# Patient Record
Sex: MALE | Race: WHITE | NOT HISPANIC OR LATINO
[De-identification: names, ages, dates, MRNs, and addresses within clinical notes are randomized per-mention and may not be internally consistent; named-entity substitution may affect disease eponyms.]

---

## 2022-08-23 ENCOUNTER — NON-APPOINTMENT (OUTPATIENT)
Age: 62
End: 2022-08-23

## 2022-08-25 ENCOUNTER — TRANSCRIPTION ENCOUNTER (OUTPATIENT)
Age: 62
End: 2022-08-25

## 2022-08-25 ENCOUNTER — APPOINTMENT (OUTPATIENT)
Dept: NEPHROLOGY | Facility: CLINIC | Age: 62
End: 2022-08-25

## 2022-08-25 VITALS
WEIGHT: 210 LBS | RESPIRATION RATE: 14 BRPM | DIASTOLIC BLOOD PRESSURE: 60 MMHG | HEIGHT: 70 IN | BODY MASS INDEX: 30.06 KG/M2 | SYSTOLIC BLOOD PRESSURE: 152 MMHG | HEART RATE: 66 BPM

## 2022-08-25 DIAGNOSIS — Z83.3 FAMILY HISTORY OF DIABETES MELLITUS: ICD-10-CM

## 2022-08-25 DIAGNOSIS — Z72.89 OTHER PROBLEMS RELATED TO LIFESTYLE: ICD-10-CM

## 2022-08-25 DIAGNOSIS — E79.0 HYPERURICEMIA W/OUT SIGNS OF INFLAMMATORY ARTHRITIS AND TOPHACEOUS DISEASE: ICD-10-CM

## 2022-08-25 DIAGNOSIS — Z84.1 FAMILY HISTORY OF DISORDERS OF KIDNEY AND URETER: ICD-10-CM

## 2022-08-25 DIAGNOSIS — Z78.9 OTHER SPECIFIED HEALTH STATUS: ICD-10-CM

## 2022-08-25 DIAGNOSIS — Z86.69 PERSONAL HISTORY OF OTHER DISEASES OF THE NERVOUS SYSTEM AND SENSE ORGANS: ICD-10-CM

## 2022-08-25 DIAGNOSIS — Z85.47 PERSONAL HISTORY OF MALIGNANT NEOPLASM OF TESTIS: ICD-10-CM

## 2022-08-25 DIAGNOSIS — G43.909 MIGRAINE, UNSPECIFIED, NOT INTRACTABLE, W/OUT STATUS MIGRAINOSUS: ICD-10-CM

## 2022-08-25 DIAGNOSIS — Z83.79 FAMILY HISTORY OF OTHER DISEASES OF THE DIGESTIVE SYSTEM: ICD-10-CM

## 2022-08-25 DIAGNOSIS — I35.0 NONRHEUMATIC AORTIC (VALVE) STENOSIS: ICD-10-CM

## 2022-08-25 DIAGNOSIS — Z00.00 ENCOUNTER FOR GENERAL ADULT MEDICAL EXAMINATION W/OUT ABNORMAL FINDINGS: ICD-10-CM

## 2022-08-25 PROCEDURE — 99204 OFFICE O/P NEW MOD 45 MIN: CPT

## 2022-08-25 RX ORDER — ELETRIPTAN HYDROBROMIDE 40 MG/1
40 TABLET, FILM COATED ORAL
Refills: 0 | Status: ACTIVE | COMMUNITY

## 2022-08-25 RX ORDER — ZOLPIDEM TARTRATE 10 MG/1
10 TABLET ORAL
Refills: 0 | Status: ACTIVE | COMMUNITY

## 2022-08-25 RX ORDER — DIAZEPAM 10 MG/1
10 TABLET ORAL
Refills: 0 | Status: ACTIVE | COMMUNITY

## 2022-08-25 RX ORDER — ALLOPURINOL 300 MG/1
300 TABLET ORAL DAILY
Qty: 90 | Refills: 1 | Status: ACTIVE | COMMUNITY

## 2022-08-25 NOTE — CONSULT LETTER
[Dear  ___] : Dear  [unfilled], [Consult Letter:] : I had the pleasure of evaluating your patient, [unfilled]. [Please see my note below.] : Please see my note below. [Consult Closing:] : Thank you very much for allowing me to participate in the care of this patient.  If you have any questions, please do not hesitate to contact me. [Sincerely,] : Sincerely, [DrRandy  ___] : Dr. BHATIA [FreeTextEntry3] : Darren

## 2022-08-25 NOTE — PHYSICAL EXAM
[General Appearance - Alert] : alert [General Appearance - In No Acute Distress] : in no acute distress [General Appearance - Well Nourished] : well nourished [General Appearance - Well Developed] : well developed [Sclera] : the sclera and conjunctiva were normal [Extraocular Movements] : extraocular movements were intact [Neck Appearance] : the appearance of the neck was normal [] : no respiratory distress [Respiration, Rhythm And Depth] : normal respiratory rhythm and effort [Exaggerated Use Of Accessory Muscles For Inspiration] : no accessory muscle use [Auscultation Breath Sounds / Voice Sounds] : lungs were clear to auscultation bilaterally [Heart Rate And Rhythm] : heart rate was normal and rhythm regular [Heart Sounds] : normal S1 and S2 [Heart Sounds Gallop] : no gallops [Heart Sounds Pericardial Friction Rub] : no pericardial rub [Full Pulse] : the pedal pulses are present [Edema] : there was no peripheral edema [Bowel Sounds] : normal bowel sounds [Abdomen Soft] : soft [Abdomen Tenderness] : non-tender [Abnormal Walk] : normal gait [Nail Clubbing] : no clubbing  or cyanosis of the fingernails [Involuntary Movements] : no involuntary movements were seen [Skin Color & Pigmentation] : normal skin color and pigmentation [Skin Turgor] : normal skin turgor [No Focal Deficits] : no focal deficits [Oriented To Time, Place, And Person] : oriented to person, place, and time [Impaired Insight] : insight and judgment were intact [Affect] : the affect was normal [Mood] : the mood was normal [FreeTextEntry1] : bilateral radial pulses 2+ with equal timing

## 2022-08-25 NOTE — HISTORY OF PRESENT ILLNESS
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 Martin Elizabeth who I last saw in 2016 for a mildly elevated serum creatinine which I suspected was due to the use of Benicar and two diuretics.  He is here today regarding an elevated serum creatinine.  Dr. Krueger has been well for the past 6 years.  Dr. Krueger was started on fenofibrate around February 2022 for elevated triglycerides.  The dose was increased around May 2022.  He lost around 25 pounds.  His triglyceride level improved.  In addition, his allopurinol dose was increased around May 2022.

## 2022-08-25 NOTE — REVIEW OF SYSTEMS
[Loss Of Hearing] : hearing loss [Negative] : Heme/Lymph [FreeTextEntry2] : Intentional weight loss of 25 pounds (with diet and exercise), treadmill, elliptical, rowing machine [FreeTextEntry8] : nocturia x 1 [de-identified] : balancing issues (Meniere's), bilateral tingling "in the front part of the feet"- developed a few years ago

## 2022-08-25 NOTE — ASSESSMENT
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  II last saw in 2016 for a mildly elevated serum creatinine which I suspected was due to the use of Benicar and two diuretics.  He is here today regarding an elevated serum creatinine.  Dr. Krueger has been well for the past 6 years.  Dr. Krueger was started on fenofibrate around February 2022 for elevated triglycerides.  The dose was increased around May 2022.  He lost around 25 pounds.  His triglyceride level improved.  In addition, his allopurinol dose was increased around May 2022.\par \par Dr. Krueger's BUN/creatinine levels have trended as follows:  26/1.29 (03/12/2015), 29/1.49 (11/20/2015) ---> 28/1.50 (05/12/2017), 23/1.29 (05/14/2018), 35/1.65 (09/20/2019), 24/1.54 (06/26/2020), 28/1.44 (12/10/2021), 20/1.67 (05/02/2022), 26/1.92 (07/01/2022).\par \par The urinalysis of 05/02/2022 has a specific gravity of 1.030 or greater with no blood or protein, and moderate amorphous urates. \par \par IMPRESSION:\par \par (1) Elevated serum creatinine for years.  The variations in the serum creatinine between 2015 and 2021 are suggestive of variations in hydration (+/- medication changes if there were any).  \par \par (2) Acute kidney injury. Increasing serum creatinine.  There is a definite trend from December 2021 until this summer.  I am unsure a lack of hydration while taking an ARB and two diuretics in the presence of the very warm weather in a gentleman that exercises on a regular bases explains the change in kidney function, though it may.  Note that Fenofibrate has been associated with TAISHA.\par \par (3) Hypertension.  Elevated blood pressures today in the office.  Dr. Krueger reports he has white coat hypertension.\par \par (4) Gout.  No recent exacerbations\par \par RECOMMENDATIONS:\par \par (1) Increase water intake to eight 8-ounce glasses daily.\par (2) I ordered repeat lab studies to be done in 1-2 weeks\par (3) Renal ultrasound.\par (4) I made no changes to the medication regimen.\par (5) Check blood pressures at home and text them to me. \par \par I will call Dr. Krueger with the results of the tests and make further recommendations \par \par

## 2022-09-02 LAB
ALBUMIN SERPL ELPH-MCNC: 4.6 G/DL
ALP BLD-CCNC: 37 U/L
ALT SERPL-CCNC: 35 U/L
ANION GAP SERPL CALC-SCNC: 14 MMOL/L
APPEARANCE: CLEAR
AST SERPL-CCNC: 22 U/L
BACTERIA: NEGATIVE
BASOPHILS # BLD AUTO: 0.05 K/UL
BASOPHILS NFR BLD AUTO: 0.8 %
BILIRUB SERPL-MCNC: 0.6 MG/DL
BILIRUBIN URINE: NEGATIVE
BLOOD URINE: NEGATIVE
BUN SERPL-MCNC: 26 MG/DL
CALCIUM SERPL-MCNC: 10.1 MG/DL
CHLORIDE SERPL-SCNC: 104 MMOL/L
CK SERPL-CCNC: 42 U/L
CO2 SERPL-SCNC: 25 MMOL/L
COLOR: NORMAL
CREAT SERPL-MCNC: 1.69 MG/DL
EGFR: 46 ML/MIN/1.73M2
EOSINOPHIL # BLD AUTO: 0.12 K/UL
EOSINOPHIL NFR BLD AUTO: 1.8 %
GLUCOSE QUALITATIVE U: NEGATIVE
GLUCOSE SERPL-MCNC: 100 MG/DL
HCT VFR BLD CALC: 46.3 %
HGB BLD-MCNC: 15.8 G/DL
HYALINE CASTS: 0 /LPF
IMM GRANULOCYTES NFR BLD AUTO: 0.6 %
KETONES URINE: NEGATIVE
LEUKOCYTE ESTERASE URINE: NEGATIVE
LYMPHOCYTES # BLD AUTO: 1.4 K/UL
LYMPHOCYTES NFR BLD AUTO: 21.2 %
MAN DIFF?: NORMAL
MCHC RBC-ENTMCNC: 30.7 PG
MCHC RBC-ENTMCNC: 34.1 GM/DL
MCV RBC AUTO: 89.9 FL
MICROSCOPIC-UA: NORMAL
MONOCYTES # BLD AUTO: 0.74 K/UL
MONOCYTES NFR BLD AUTO: 11.2 %
NEUTROPHILS # BLD AUTO: 4.25 K/UL
NEUTROPHILS NFR BLD AUTO: 64.4 %
NITRITE URINE: NEGATIVE
PH URINE: 5
PLATELET # BLD AUTO: 212 K/UL
POTASSIUM SERPL-SCNC: 4.2 MMOL/L
PROT SERPL-MCNC: 6.3 G/DL
PROTEIN URINE: NEGATIVE
RBC # BLD: 5.15 M/UL
RBC # FLD: 13.5 %
RED BLOOD CELLS URINE: 1 /HPF
SODIUM ?TM SUB UR QN: 47 MMOL/L
SODIUM SERPL-SCNC: 144 MMOL/L
SPECIFIC GRAVITY URINE: 1.02
SQUAMOUS EPITHELIAL CELLS: 1 /HPF
URATE SERPL-MCNC: 4.5 MG/DL
UROBILINOGEN URINE: NORMAL
WBC # FLD AUTO: 6.6 K/UL
WHITE BLOOD CELLS URINE: 1 /HPF

## 2022-09-20 ENCOUNTER — APPOINTMENT (OUTPATIENT)
Dept: NEPHROLOGY | Facility: CLINIC | Age: 62
End: 2022-09-20

## 2022-09-20 VITALS
SYSTOLIC BLOOD PRESSURE: 134 MMHG | DIASTOLIC BLOOD PRESSURE: 60 MMHG | HEART RATE: 66 BPM | RESPIRATION RATE: 12 BRPM | HEIGHT: 70 IN

## 2022-09-20 DIAGNOSIS — N17.9 ACUTE KIDNEY FAILURE, UNSPECIFIED: ICD-10-CM

## 2022-09-20 DIAGNOSIS — R79.89 OTHER SPECIFIED ABNORMAL FINDINGS OF BLOOD CHEMISTRY: ICD-10-CM

## 2022-09-20 PROCEDURE — 99214 OFFICE O/P EST MOD 30 MIN: CPT

## 2022-09-20 NOTE — REVIEW OF SYSTEMS
[Loss Of Hearing] : hearing loss [Negative] : Heme/Lymph [FreeTextEntry2] : Intentional weight loss of 25 pounds (with diet and exercise), treadmill, elliptical, rowing machine- stable since the last visit [FreeTextEntry8] : nocturia x 1 [de-identified] : balancing issues (Meniere's), bilateral tingling "in the front part of the feet"- developed a few years ago

## 2022-09-20 NOTE — ASSESSMENT
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  II last saw in 2016 for a mildly elevated serum creatinine which I suspected was due to the use of Benicar and two diuretics.  He is here today regarding an elevated serum creatinine.  Dr. Krueger has been well for the past 6 years.   Pati was started on fenofibrate around February 2022 for elevated triglycerides.  The dose was increased around May 2022.  He lost around 25 pounds.  His triglyceride level improved.  In addition, his allopurinol dose was increased around May 2022.\par \par  Pati's BUN/creatinine levels have trended as follows:  26/1.29 (03/12/2015), 29/1.49 (11/20/2015) ---> 28/1.50 (05/12/2017), 23/1.29 (05/14/2018), 35/1.65 (09/20/2019), 24/1.54 (06/26/2020), 28/1.44 (12/10/2021), 20/1.67 (05/02/2022), 26/1.92 (07/01/2022) --> 26/1.69 (08/25/2022).\par \par The urinalysis of 05/02/2022 has a specific gravity of 1.030 or greater with no blood or protein, and moderate amorphous urates.   The urinalysis of 08/31/2022 was normal except for a single WBC and a single RBC in addition to an epithelial cell.\par \par The renal ultrasound of 08/27/2022 demonstrated a 10.8 cm right kidney and 11 cm left kidney both with normal cortical thickness and echogenicity; a simple cyst in the left kidney measuring 1.3 cm  AND a partially imaged liver with increased echogenicity typical for steatosis. \par \par IMPRESSION:\par \par (1) Elevated serum creatinine for years.  The variations in the serum creatinine between 2015 and 2021 are suggestive of variations in hydration (+/- medication changes if there were any).  The rest of the workup was normal.  I suspect Dr. Krueger's renal function is either normal or close to it. \par \par (2) Acute kidney injury. Increasing serum creatinine.  I suspect this was all due to dehydration. \par \par (3) Hypertension.  Better blood pressure today.  \par \par (4) Gout.  No recent exacerbations\par \par (5) Abnormal ultrasound of liver. \par \par RECOMMENDATIONS:\par \par (1) Continue staying well hydrated\par (2) Continue exercise and weight loss.  We talked about the possibility of removing some of the antihypertensive medications in the future.\par (3) I made no changes to the medication regimen.\par (4) Please follow up on the abnormal ultrasound of part of the liver\par \par Dr. Krueger will return in 1 year for follow up. \par \par

## 2022-09-20 NOTE — PHYSICAL EXAM
[General Appearance - Alert] : alert [General Appearance - In No Acute Distress] : in no acute distress [General Appearance - Well Nourished] : well nourished [General Appearance - Well Developed] : well developed [Sclera] : the sclera and conjunctiva were normal [Extraocular Movements] : extraocular movements were intact [Neck Appearance] : the appearance of the neck was normal [] : no respiratory distress [Respiration, Rhythm And Depth] : normal respiratory rhythm and effort [Exaggerated Use Of Accessory Muscles For Inspiration] : no accessory muscle use [Auscultation Breath Sounds / Voice Sounds] : lungs were clear to auscultation bilaterally [Heart Rate And Rhythm] : heart rate was normal and rhythm regular [Heart Sounds] : normal S1 and S2 [Heart Sounds Gallop] : no gallops [Heart Sounds Pericardial Friction Rub] : no pericardial rub [Edema] : there was no peripheral edema [Bowel Sounds] : normal bowel sounds [Abdomen Soft] : soft [Abdomen Tenderness] : non-tender [Abnormal Walk] : normal gait [Involuntary Movements] : no involuntary movements were seen [Skin Color & Pigmentation] : normal skin color and pigmentation [Skin Turgor] : normal skin turgor [No Focal Deficits] : no focal deficits [Oriented To Time, Place, And Person] : oriented to person, place, and time [Impaired Insight] : insight and judgment were intact [Affect] : the affect was normal [Mood] : the mood was normal [FreeTextEntry1] : LIBRA/DM best heard in the LUSB

## 2022-09-20 NOTE — HISTORY OF PRESENT ILLNESS
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 Martin Elizabeth who I last saw in 2016 for a mildly elevated serum creatinine which I suspected was due to the use of Benicar and two diuretics.  He is here for follow up  regarding an elevated serum creatinine.  \par \par Dr. Krueger has been well for the past 6 years.  Dr. Krueger was started on fenofibrate around February 2022 for elevated triglycerides.  The dose was increased around May 2022.  He lost around 25 pounds.  His triglyceride level improved.  In addition, his allopurinol dose was increased around May 2022.

## 2023-01-24 ENCOUNTER — APPOINTMENT (OUTPATIENT)
Dept: NEPHROLOGY | Facility: CLINIC | Age: 63
End: 2023-01-24

## 2023-06-27 ENCOUNTER — NON-APPOINTMENT (OUTPATIENT)
Age: 63
End: 2023-06-27

## 2023-08-08 ENCOUNTER — APPOINTMENT (OUTPATIENT)
Dept: NEPHROLOGY | Facility: CLINIC | Age: 63
End: 2023-08-08

## 2023-09-19 ENCOUNTER — APPOINTMENT (OUTPATIENT)
Dept: NEPHROLOGY | Facility: CLINIC | Age: 63
End: 2023-09-19

## 2024-04-11 ENCOUNTER — APPOINTMENT (OUTPATIENT)
Dept: NEPHROLOGY | Facility: CLINIC | Age: 64
End: 2024-04-11
Payer: COMMERCIAL

## 2024-04-11 PROCEDURE — G2211 COMPLEX E/M VISIT ADD ON: CPT

## 2024-04-11 PROCEDURE — 99214 OFFICE O/P EST MOD 30 MIN: CPT

## 2024-04-11 RX ORDER — FENOFIBRATE 145 MG/1
145 TABLET, COATED ORAL DAILY
Refills: 0 | Status: ACTIVE | COMMUNITY

## 2024-04-11 RX ORDER — FENOFIBRATE 150 MG/1
150 CAPSULE ORAL DAILY
Refills: 0 | Status: DISCONTINUED | COMMUNITY
End: 2024-04-11

## 2024-04-11 RX ORDER — TELMISARTAN 80 MG/1
80 TABLET ORAL DAILY
Refills: 0 | Status: ACTIVE | COMMUNITY

## 2024-04-11 RX ORDER — AMLODIPINE BESYLATE 10 MG/1
10 TABLET ORAL DAILY
Refills: 0 | Status: ACTIVE | COMMUNITY

## 2024-04-12 LAB
ALBUMIN SERPL ELPH-MCNC: 4.9 G/DL
ANION GAP SERPL CALC-SCNC: 16 MMOL/L
BUN SERPL-MCNC: 21 MG/DL
CALCIUM SERPL-MCNC: 10 MG/DL
CHLORIDE SERPL-SCNC: 103 MMOL/L
CO2 SERPL-SCNC: 23 MMOL/L
CREAT SERPL-MCNC: 1.7 MG/DL
CREAT SPEC-SCNC: 43 MG/DL
CREAT/PROT UR: 0.2 RATIO
EGFR: 45 ML/MIN/1.73M2
GLUCOSE SERPL-MCNC: 98 MG/DL
PHOSPHATE SERPL-MCNC: 2.7 MG/DL
POTASSIUM SERPL-SCNC: 4 MMOL/L
PROT UR-MCNC: 7 MG/DL
SODIUM SERPL-SCNC: 142 MMOL/L

## 2024-04-12 RX ORDER — HYDROCHLOROTHIAZIDE 12.5 MG/1
12.5 TABLET ORAL DAILY
Qty: 60 | Refills: 2 | Status: ACTIVE | COMMUNITY
Start: 2024-04-12 | End: 1900-01-01

## 2024-04-12 NOTE — PHYSICAL EXAM
[General Appearance - Alert] : alert [General Appearance - In No Acute Distress] : in no acute distress [Sclera] : the sclera and conjunctiva were normal [Outer Ear] : the ears and nose were normal in appearance [Jugular Venous Distention Increased] : there was no jugular-venous distention [Respiration, Rhythm And Depth] : normal respiratory rhythm and effort [Heart Sounds] : normal S1 and S2 [Heart Sounds Gallop] : no gallops [Edema] : there was no peripheral edema [Bowel Sounds] : normal bowel sounds [Abdomen Soft] : soft [No CVA Tenderness] : no ~M costovertebral angle tenderness [Nail Clubbing] : no clubbing  or cyanosis of the fingernails [] : no rash [No Focal Deficits] : no focal deficits [Oriented To Time, Place, And Person] : oriented to person, place, and time [Affect] : the affect was normal [Mood] : the mood was normal

## 2024-04-12 NOTE — ASSESSMENT
[FreeTextEntry1] : 1.CKD stage 3a: Pt with elevated serum creatinine for years. The variation in serum creatinine over the years was secondary to volume status of the patient. Recent Scr ~1.9 in January 2024. Pt likely has CKD secondary to long standing HTN. Importance of good BP control reviewed with patient. Continue with ARB (Telmisartan) for HTN and and anti-proteinuric effect. Check renal panel, UA and UPCR today. Advised patient to avoid NSAIDs, RCAs, and other nephrotoxins. Monitor renal function.  2.HTN: Repeat BP reading in higher range during office visit. Continue to monitor BP on current BP meds. Will start another HTN medication pending renal panel. If the BP remains elevated, will consider work up for secondary HTN. Advised to maintain BP recording log at home. Low salt diet advised.  Follow up pending lab results.

## 2024-04-12 NOTE — HISTORY OF PRESENT ILLNESS
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 Martin Elizabeth  who is coming for a follow up visit after ~19 months.  Pt was initially seen by Dr. Darren Luz for elevated serum creatinine, with last visit being in November 2022.    On review, It was noted that Pt has elevated serum creatinine for years. The variation in serum creatinine over the years was secondary to volume status of the patient.    Scr trend: 1.65 (09/20/19), 1.54 (06/26/20), 1.44 (12/10/21), 1.92 (07/01/2022), 1.9 (6/16/23).  UA done on 6/16/23 was WNL with No RBCs and proteins (but has high specific gravity).  Kidney US done on (08/27/2022 ) showed no renal masses, hydronephrosis, or calculi.  Pt says his BP has been well controlled until recently when it was ranging on higher side. Pt says his Losartan dose was initially increased to 100 mg daily HCTZ 12.5 mg daily was added and spironolactone was started in Jan 2024. Repeat Scr was elevated at 1.9, Spironolactone and HCTZ were was discontinued. Pt currently taking oral Telmisartan 80mg daily, and Amlodipine 10 mg for BP control.    Pt. currently feels well. Pt. denies any chest pain, palpitations, headache, SOB, nausea, dysuria, weakness. Home SBP recording ranging ~ 130-140mmHg.

## 2024-04-13 LAB
APPEARANCE: CLEAR
BILIRUBIN URINE: NEGATIVE
BLOOD URINE: NEGATIVE
COLOR: YELLOW
GLUCOSE QUALITATIVE U: NEGATIVE MG/DL
KETONES URINE: NEGATIVE MG/DL
LEUKOCYTE ESTERASE URINE: NEGATIVE
NITRITE URINE: NEGATIVE
PH URINE: 7
PROTEIN URINE: NEGATIVE MG/DL
SPECIFIC GRAVITY URINE: 1.01
UROBILINOGEN URINE: 0.2 MG/DL

## 2024-05-30 ENCOUNTER — APPOINTMENT (OUTPATIENT)
Dept: NEPHROLOGY | Facility: CLINIC | Age: 64
End: 2024-05-30
Payer: COMMERCIAL

## 2024-05-30 VITALS — DIASTOLIC BLOOD PRESSURE: 74 MMHG | SYSTOLIC BLOOD PRESSURE: 132 MMHG

## 2024-05-30 VITALS — HEART RATE: 70 BPM | OXYGEN SATURATION: 99 %

## 2024-05-30 DIAGNOSIS — I10 ESSENTIAL (PRIMARY) HYPERTENSION: ICD-10-CM

## 2024-05-30 DIAGNOSIS — N18.31 CHRONIC KIDNEY DISEASE, STAGE 3A: ICD-10-CM

## 2024-05-30 PROCEDURE — G2211 COMPLEX E/M VISIT ADD ON: CPT

## 2024-05-30 PROCEDURE — 99214 OFFICE O/P EST MOD 30 MIN: CPT

## 2024-05-30 RX ORDER — LOSARTAN POTASSIUM 50 MG/1
50 TABLET, FILM COATED ORAL DAILY
Qty: 90 | Refills: 1 | Status: DISCONTINUED | COMMUNITY
End: 2024-05-30

## 2024-05-30 RX ORDER — TRIAMTERENE AND HYDROCHLOROTHIAZIDE 37.5; 25 MG/1; MG/1
37.5-25 CAPSULE ORAL
Refills: 0 | Status: DISCONTINUED | COMMUNITY
End: 2024-05-30

## 2024-05-30 NOTE — ASSESSMENT
[FreeTextEntry1] : 1.CKD stage 3a: Pt with elevated serum creatinine for years. The variation in serum creatinine over the years was secondary to volume status of the patient. Scr ~1.9 in January 2024. Pt likely has CKD secondary to long standing HTN. Recent Scr was elevated/ stable at 1.70 on 4/11/24. UA done on 4/11/24 was WNl and UPCR was also WNL. Importance of good BP control reviewed with patient. Continue with ARB (Telmisartan) for HTN and anti-proteinuric effect. Check renal panel today. if Scr remained elevated, will consider discontinuing Fenofibrate therapy.  Advised patient to avoid NSAIDs, RCAs, and other nephrotoxins. Monitor renal function.  2.HTN: Repeat BP reading in acceptable range during office visit. Continue to monitor BP on current BP meds. Low salt diet advised.  Follow up pending lab results.

## 2024-05-30 NOTE — HISTORY OF PRESENT ILLNESS
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 Martin Elizabeth who is coming for a follow up visit. Pt was last seen in clinic on 4/11/24.   Kidney history: Pt was initially seen by Dr. Darren Luz for elevated serum creatinine, with last visit being in November 2022.  On review, It was noted that Pt has elevated serum creatinine for years. The variation in serum creatinine over the years was secondary to volume status of the patient.     Scr trend: 1.65 (09/20/19), 1.54 (06/26/20), 1.44 (12/10/21), 1.92 (07/01/2022), 1.9 (6/16/23), Scr was 1.70 on 4/11/24.   UA done on 6/16/23 was WNL with No RBCs and proteins (but has high specific gravity).  Kidney US done on (08/27/2022) showed no renal masses, hydronephrosis, or calculi.  Pt says his BP has been well controlled until recently when it was ranging on higher side. Pt says his Losartan dose was initially increased to 100 mg daily HCTZ 12.5 mg daily was added, and spironolactone was started in Jan 2024. Repeat Scr was elevated at 1.9, Spironolactone and HCTZ were discontinued. Pt currently taking oral Telmisartan 80mg daily, and Amlodipine 10 mg for BP control. oral HCTZ 12.5 mg daily was resumed on 4/11/24 in view of high BP.    Pt. currently feels well. Pt. denies any chest pain, palpitations, headache, SOB, nausea, dysuria, weakness. Home SBP recording ranging ~ 120-130mmHg.

## 2024-06-18 LAB
ALBUMIN SERPL ELPH-MCNC: 4.8 G/DL
ANION GAP SERPL CALC-SCNC: 14 MMOL/L
BUN SERPL-MCNC: 23 MG/DL
CALCIUM SERPL-MCNC: 9.7 MG/DL
CHLORIDE SERPL-SCNC: 103 MMOL/L
CO2 SERPL-SCNC: 24 MMOL/L
CREAT SERPL-MCNC: 1.67 MG/DL
EGFR: 46 ML/MIN/1.73M2
GLUCOSE SERPL-MCNC: 94 MG/DL
PHOSPHATE SERPL-MCNC: 3.4 MG/DL
POTASSIUM SERPL-SCNC: 3.9 MMOL/L
SODIUM SERPL-SCNC: 141 MMOL/L

## 2024-07-18 ENCOUNTER — APPOINTMENT (OUTPATIENT)
Dept: NEPHROLOGY | Facility: CLINIC | Age: 64
End: 2024-07-18

## 2024-10-17 ENCOUNTER — APPOINTMENT (OUTPATIENT)
Dept: NEPHROLOGY | Facility: CLINIC | Age: 64
End: 2024-10-17

## 2024-10-17 ENCOUNTER — NON-APPOINTMENT (OUTPATIENT)
Age: 64
End: 2024-10-17

## 2024-10-17 VITALS
BODY MASS INDEX: 31.28 KG/M2 | OXYGEN SATURATION: 98 % | HEART RATE: 70 BPM | WEIGHT: 218 LBS | DIASTOLIC BLOOD PRESSURE: 66 MMHG | SYSTOLIC BLOOD PRESSURE: 142 MMHG

## 2024-10-17 VITALS — DIASTOLIC BLOOD PRESSURE: 68 MMHG | SYSTOLIC BLOOD PRESSURE: 128 MMHG

## 2024-10-17 DIAGNOSIS — N18.31 CHRONIC KIDNEY DISEASE, STAGE 3A: ICD-10-CM

## 2024-10-17 DIAGNOSIS — I10 ESSENTIAL (PRIMARY) HYPERTENSION: ICD-10-CM

## 2024-10-17 PROCEDURE — 99213 OFFICE O/P EST LOW 20 MIN: CPT

## 2024-10-17 PROCEDURE — G2211 COMPLEX E/M VISIT ADD ON: CPT | Mod: NC

## 2024-10-22 LAB
ALBUMIN SERPL ELPH-MCNC: 4.6 G/DL
ANION GAP SERPL CALC-SCNC: 18 MMOL/L
BUN SERPL-MCNC: 22 MG/DL
CALCIUM SERPL-MCNC: 10.2 MG/DL
CALCIUM SERPL-MCNC: 10.2 MG/DL
CHLORIDE SERPL-SCNC: 103 MMOL/L
CO2 SERPL-SCNC: 20 MMOL/L
CREAT SERPL-MCNC: 1.68 MG/DL
EGFR: 45 ML/MIN/1.73M2
GLUCOSE SERPL-MCNC: 120 MG/DL
PARATHYROID HORMONE INTACT: 38 PG/ML
PHOSPHATE SERPL-MCNC: 3 MG/DL
POTASSIUM SERPL-SCNC: 3.9 MMOL/L
SODIUM SERPL-SCNC: 141 MMOL/L

## 2024-12-25 PROBLEM — F10.90 ALCOHOL USE: Status: ACTIVE | Noted: 2022-08-25

## 2025-03-13 ENCOUNTER — APPOINTMENT (OUTPATIENT)
Dept: NEPHROLOGY | Facility: CLINIC | Age: 65
End: 2025-03-13
Payer: COMMERCIAL

## 2025-03-13 VITALS
WEIGHT: 208 LBS | SYSTOLIC BLOOD PRESSURE: 134 MMHG | HEART RATE: 63 BPM | DIASTOLIC BLOOD PRESSURE: 66 MMHG | OXYGEN SATURATION: 98 % | BODY MASS INDEX: 29.85 KG/M2

## 2025-03-13 DIAGNOSIS — N18.31 CHRONIC KIDNEY DISEASE, STAGE 3A: ICD-10-CM

## 2025-03-13 DIAGNOSIS — I10 ESSENTIAL (PRIMARY) HYPERTENSION: ICD-10-CM

## 2025-03-13 PROCEDURE — 99214 OFFICE O/P EST MOD 30 MIN: CPT

## 2025-03-13 PROCEDURE — G2211 COMPLEX E/M VISIT ADD ON: CPT | Mod: NC

## 2025-03-14 LAB
ALBUMIN SERPL ELPH-MCNC: 4.6 G/DL
ANION GAP SERPL CALC-SCNC: 14 MMOL/L
BUN SERPL-MCNC: 26 MG/DL
CALCIUM SERPL-MCNC: 10 MG/DL
CALCIUM SERPL-MCNC: 10 MG/DL
CHLORIDE SERPL-SCNC: 103 MMOL/L
CO2 SERPL-SCNC: 24 MMOL/L
CREAT SERPL-MCNC: 1.76 MG/DL
EGFRCR SERPLBLD CKD-EPI 2021: 43 ML/MIN/1.73M2
GLUCOSE SERPL-MCNC: 122 MG/DL
PARATHYROID HORMONE INTACT: 36 PG/ML
PHOSPHATE SERPL-MCNC: 3.3 MG/DL
POTASSIUM SERPL-SCNC: 4.1 MMOL/L
SODIUM SERPL-SCNC: 141 MMOL/L

## 2025-09-11 ENCOUNTER — NON-APPOINTMENT (OUTPATIENT)
Age: 65
End: 2025-09-11

## 2025-09-11 ENCOUNTER — APPOINTMENT (OUTPATIENT)
Dept: NEPHROLOGY | Facility: CLINIC | Age: 65
End: 2025-09-11
Payer: COMMERCIAL

## 2025-09-11 VITALS
HEART RATE: 61 BPM | DIASTOLIC BLOOD PRESSURE: 66 MMHG | BODY MASS INDEX: 30.35 KG/M2 | HEIGHT: 70 IN | OXYGEN SATURATION: 98 % | SYSTOLIC BLOOD PRESSURE: 132 MMHG | WEIGHT: 212 LBS

## 2025-09-11 DIAGNOSIS — N18.32 CHRONIC KIDNEY DISEASE, STAGE 3B: ICD-10-CM

## 2025-09-11 DIAGNOSIS — R60.0 LOCALIZED EDEMA: ICD-10-CM

## 2025-09-11 DIAGNOSIS — E78.5 HYPERLIPIDEMIA, UNSPECIFIED: ICD-10-CM

## 2025-09-11 DIAGNOSIS — I10 ESSENTIAL (PRIMARY) HYPERTENSION: ICD-10-CM

## 2025-09-11 PROCEDURE — 99214 OFFICE O/P EST MOD 30 MIN: CPT

## 2025-09-11 PROCEDURE — G2211 COMPLEX E/M VISIT ADD ON: CPT | Mod: NC

## 2025-09-11 RX ORDER — ATORVASTATIN CALCIUM 80 MG/1
TABLET, FILM COATED ORAL
Refills: 0 | Status: ACTIVE | COMMUNITY

## 2025-09-12 ENCOUNTER — NON-APPOINTMENT (OUTPATIENT)
Age: 65
End: 2025-09-12

## 2025-09-12 LAB
ALBUMIN SERPL ELPH-MCNC: 4.5 G/DL
ANION GAP SERPL CALC-SCNC: 17 MMOL/L
BASOPHILS # BLD AUTO: 0.06 K/UL
BASOPHILS NFR BLD AUTO: 0.9 %
BUN SERPL-MCNC: 22 MG/DL
CALCIUM SERPL-MCNC: 9.9 MG/DL
CALCIUM SERPL-MCNC: 9.9 MG/DL
CHLORIDE SERPL-SCNC: 104 MMOL/L
CO2 SERPL-SCNC: 21 MMOL/L
CREAT SERPL-MCNC: 1.2 MG/DL
EGFRCR SERPLBLD CKD-EPI 2021: 68 ML/MIN/1.73M2
EOSINOPHIL # BLD AUTO: 0.14 K/UL
EOSINOPHIL NFR BLD AUTO: 2.1 %
GLUCOSE SERPL-MCNC: 95 MG/DL
HCT VFR BLD CALC: 43.3 %
HGB BLD-MCNC: 14.7 G/DL
IMM GRANULOCYTES NFR BLD AUTO: 0.4 %
LYMPHOCYTES # BLD AUTO: 1.36 K/UL
LYMPHOCYTES NFR BLD AUTO: 20 %
MAN DIFF?: NORMAL
MCHC RBC-ENTMCNC: 29.9 PG
MCHC RBC-ENTMCNC: 33.9 G/DL
MCV RBC AUTO: 88 FL
MONOCYTES # BLD AUTO: 0.69 K/UL
MONOCYTES NFR BLD AUTO: 10.2 %
NEUTROPHILS # BLD AUTO: 4.51 K/UL
NEUTROPHILS NFR BLD AUTO: 66.4 %
PARATHYROID HORMONE INTACT: 45 PG/ML
PHOSPHATE SERPL-MCNC: 4.2 MG/DL
PLATELET # BLD AUTO: 253 K/UL
POTASSIUM SERPL-SCNC: 4 MMOL/L
RBC # BLD: 4.92 M/UL
RBC # FLD: 14.2 %
SODIUM SERPL-SCNC: 141 MMOL/L
WBC # FLD AUTO: 6.79 K/UL